# Patient Record
Sex: FEMALE | Race: WHITE | Employment: STUDENT | RURAL
[De-identification: names, ages, dates, MRNs, and addresses within clinical notes are randomized per-mention and may not be internally consistent; named-entity substitution may affect disease eponyms.]

---

## 2023-02-16 ENCOUNTER — OFFICE VISIT (OUTPATIENT)
Dept: FAMILY MEDICINE CLINIC | Age: 23
End: 2023-02-16
Payer: COMMERCIAL

## 2023-02-16 VITALS
WEIGHT: 125 LBS | DIASTOLIC BLOOD PRESSURE: 76 MMHG | SYSTOLIC BLOOD PRESSURE: 130 MMHG | TEMPERATURE: 98.4 F | HEART RATE: 104 BPM | RESPIRATION RATE: 17 BRPM | HEIGHT: 61 IN | BODY MASS INDEX: 23.6 KG/M2 | OXYGEN SATURATION: 99 %

## 2023-02-16 DIAGNOSIS — Z11.59 NEED FOR HEPATITIS B SCREENING TEST: ICD-10-CM

## 2023-02-16 DIAGNOSIS — Z23 ENCOUNTER FOR IMMUNIZATION: ICD-10-CM

## 2023-02-16 DIAGNOSIS — Z00.00 ROUTINE PHYSICAL EXAMINATION: Primary | ICD-10-CM

## 2023-02-16 DIAGNOSIS — Z11.1 SCREENING FOR TUBERCULOSIS: ICD-10-CM

## 2023-02-16 PROBLEM — L70.9 ACNE: Status: ACTIVE | Noted: 2023-02-16

## 2023-02-16 PROBLEM — N91.4 SECONDARY OLIGOMENORRHEA: Status: ACTIVE | Noted: 2023-02-16

## 2023-02-16 PROBLEM — E28.2 POLYCYSTIC OVARIES: Status: ACTIVE | Noted: 2023-02-16

## 2023-02-16 PROBLEM — E55.9 VITAMIN D DEFICIENCY: Status: ACTIVE | Noted: 2023-02-16

## 2023-02-16 PROCEDURE — 90715 TDAP VACCINE 7 YRS/> IM: CPT | Performed by: FAMILY MEDICINE

## 2023-02-16 PROCEDURE — 99385 PREV VISIT NEW AGE 18-39: CPT | Performed by: FAMILY MEDICINE

## 2023-02-16 PROCEDURE — 90471 IMMUNIZATION ADMIN: CPT | Performed by: FAMILY MEDICINE

## 2023-02-16 RX ORDER — LANOLIN ALCOHOL/MO/W.PET/CERES
1000 CREAM (GRAM) TOPICAL DAILY
COMMUNITY

## 2023-02-16 RX ORDER — SPIRONOLACTONE 100 MG/1
100 TABLET, FILM COATED ORAL DAILY
COMMUNITY

## 2023-02-16 RX ORDER — METFORMIN HYDROCHLORIDE 500 MG/1
2 TABLET, EXTENDED RELEASE ORAL DAILY
COMMUNITY

## 2023-02-16 RX ORDER — TETANUS TOXOID, REDUCED DIPHTHERIA TOXOID AND ACELLULAR PERTUSSIS VACCINE, ADSORBED 5; 2.5; 8; 8; 2.5 [IU]/.5ML; [IU]/.5ML; UG/.5ML; UG/.5ML; UG/.5ML
0.5 SUSPENSION INTRAMUSCULAR ONCE
Qty: 0.5 ML | Refills: 0 | Status: SHIPPED | OUTPATIENT
Start: 2023-02-16 | End: 2023-02-16 | Stop reason: ALTCHOICE

## 2023-02-16 RX ORDER — CHOLECALCIFEROL (VITAMIN D3) 125 MCG
2000 CAPSULE ORAL DAILY
COMMUNITY

## 2023-02-16 NOTE — PROGRESS NOTES
Chief Complaint   Patient presents with    Establish Care     1. \"Have you been to the ER, urgent care clinic since your last visit? Hospitalized since your last visit? \" No    2. \"Have you seen or consulted any other health care providers outside of the [address] [place] [address] since your last visit? \"  New patient. Sees endocrinology. Records requested. 3. For patients aged 39-70: Has the patient had a colonoscopy / FIT/ Cologuard? NA - based on age      If the patient is female:    4. For patients aged 41-77: Has the patient had a mammogram within the past 2 years? NA - based on age or sex      11. For patients aged 21-65: Has the patient had a pap smear?  No    Health Maintenance Due   Topic Date Due    Hepatitis C Screening  Never done    Depression Screen  Never done    HPV Age 9Y-34Y (1 - 2-dose series) Never done    DTaP/Tdap/Td series (1 - Tdap) Never done    Pap Smear  Never done    COVID-19 Vaccine (2 - Pfizer series) 12/10/2021

## 2023-02-16 NOTE — LETTER
2/16/2023 10:20 AM    Ms. Chris Benito  [address] [place] [address]        Immunization History   Administered Date(s) Administered    COVID-19, PFIZER PURPLE top, DILUTE for use, (age 15 y+), IM, 30mcg/0.3mL 11/19/2021    Influenza Vaccine 01/02/2018, 01/04/2019, 01/07/2020, 10/01/2021, 09/29/2022    Tdap 02/16/2023           Sincerely,      Jacquelyn Alanis MD

## 2023-02-16 NOTE — LETTER
To Medical Records,      Please fax us the most recent office visit notes, lab results, diagnostic test results, immunization list, problem list, most recent medication list so that we may update the patient's records for continuity of care. Our fax number: [phone]    Patient:    Saintclair Apgar  2000

## 2023-02-16 NOTE — LETTER
To Dr. Radha Bermeo,        Please fax us the most recent office visit notes, problem list, recent medication list, any lab or diagnostic test results so that we may update the patient's records for continuity of care. Our fax number: [phone]    Patient:    Edu Vera  2000

## 2023-02-16 NOTE — PROGRESS NOTES
[facility]    History of Present Illness: Lyndon Hernandez is a 20 y.o. female here for   Chief Complaint   Patient presents with    Establish Care         HPI:  Patient here for school physical.  She will be starting at [employer] in July. She is followed by endocrinology for PCOS and has labs done there routinely. She denies any cough, f/c/night sweats, no weight loss. No known tb exposure. No h/o positive ppd. Due for tb testing today. Needs tdap well as hepatitis B titers. Health Maintenance  Health Maintenance Due   Topic Date Due    Hepatitis C Screening  Never done    Depression Screen  Never done    HPV Age 9Y-34Y (1 - 2-dose series) Never done    Pap Smear  Never done    COVID-19 Vaccine (2 - Pfizer series) 12/10/2021       Past Medical, Family, and Social History:     Past Medical History:   Diagnosis Date    PCOS (polycystic ovarian syndrome)       Past Surgical History:   Procedure Laterality Date    HX WISDOM TEETH EXTRACTION Bilateral        Current Outpatient Medications on File Prior to Visit   Medication Sig Dispense Refill    metFORMIN ER (GLUCOPHAGE XR) 500 mg tablet Take 2 Tablets by mouth daily. spironolactone (ALDACTONE) 100 mg tablet Take 100 mg by mouth daily. cholecalciferol, vitamin D3, (Vitamin D3) 50 mcg (2,000 unit) tab Take 2,000 Units by mouth daily. cyanocobalamin 1,000 mcg tablet Take 1,000 mcg by mouth daily. No current facility-administered medications on file prior to visit.        Patient Active Problem List   Diagnosis Code    Acne L70.9    Polycystic ovaries E28.2    Vitamin D deficiency E55.9    Secondary oligomenorrhea N91.4       Social History     Socioeconomic History    Marital status: SINGLE   Tobacco Use    Smoking status: Never    Smokeless tobacco: Never   Vaping Use    Vaping Use: Never used   Substance and Sexual Activity    Alcohol use: Yes     Comment: occasionally    Drug use: Not Currently Sexual activity: Yes     Partners: Male     Birth control/protection: Condom     Social Determinants of Health     Financial Resource Strain: Low Risk     Difficulty of Paying Living Expenses: Not hard at all   Food Insecurity: No Food Insecurity    Worried About [address] in the Last Year: Never true    [address] [facility] [address] in the Last Year: Never true   Transportation Needs: No Transportation Needs    Lack of Transportation (Medical): No    Lack of Transportation (Non-Medical): No   Housing Stability: Unknown    Unable to Pay for Housing in the Last Year: No    Unstable Housing in the Last Year: No        Review of Systems   Review of Systems   Constitutional:  Negative for chills and fever. Respiratory:  Negative for cough and shortness of breath. Cardiovascular:  Negative for chest pain. Psychiatric/Behavioral:  Negative for depression and suicidal ideas.       Objective:   Visit Vitals  /76 (BP 1 Location: Right arm, BP Patient Position: Sitting, BP Cuff Size: Adult)   Pulse (!) 104   Temp 98.4 °F (36.9 °C) (Oral)   Resp 17   Ht 5' 1.42\" (1.56 m)   Wt 125 lb (56.7 kg)   SpO2 99%   BMI 23.30 kg/m²        Physical Exam  PHYSICAL EXAM:  Gen: Pt sitting in chair, in NAD  Head: Normocephalic, atraumatic  Eyes: Sclera anicteric, EOM grossly intact, PERRL  Ears: TM's pearly with good light reflex b/l  Throat: MMM, normal lips, tongue, teeth and gums  Neck: Supple, no LAD, no thyromegaly   CVS: Normal S1, S2, no m/r/g  Resp: CTAB, no wheezes or rales  Abd: Soft, non-tender, non-distended, +BS  Extrem: Atraumatic, no cyanosis or edema  Pulses: 2+   Skin: Warm, dry  Neuro: Alert, oriented, appropriate    Pertinent Labs/Studies:  3 most recent PHQ Screens 2/16/2023   Little interest or pleasure in doing things Not at all   Feeling down, depressed, irritable, or hopeless Not at all   Total Score PHQ 2 0   Trouble falling or staying asleep, or sleeping too much Not at all   Feeling tired or having little energy Not at all   Poor appetite, weight loss, or overeating Not at all   Feeling bad about yourself - or that you are a failure or have let yourself or your family down Not at all   Trouble concentrating on things such as school, work, reading, or watching TV Not at all   Moving or speaking so slowly that other people could have noticed; or the opposite being so fidgety that others notice Not at all   Thoughts of being better off dead, or hurting yourself in some way Not at all   PHQ 9 Score 0      Vision 20/20    Assessment and orders:       ICD-10-CM ICD-9-CM    1. Routine physical examination  Z00.00 V70.0       2. Encounter for immunization  Z23 V03.89 [place] IMMUNIZ ADMIN,1 SINGLE/COMB VAC/TOXOID      TDAP, BOOSTRIX, (AGE 10 YRS+), IM      DISCONTINUED: diphth, pertus,acell,, tetanus (Boostrix Tdap) 2.5-8-5 Lf-mcg-Lf/0.5mL syrg      3. Need for hepatitis B screening test  Z11.59 V73.89 HEPATITIS B SURF AB QUANT      4. Screening for tuberculosis  Z11.1 V74.1 QUANTIFERON-TB GOLD PLUS        Diagnoses and all orders for this visit:    1. Routine physical examination    2. Encounter for immunization  -     NV IMMUNIZ ADMIN,1 SINGLE/COMB VAC/TOXOID  -     TDAP, BOOSTRIX, (AGE 10 YRS+), IM    3. Need for hepatitis B screening test  -     HEPATITIS B SURF AB QUANT    4. Screening for tuberculosis  -     QUANTIFERON-TB GOLD PLUS    Form completed, labs ordered  lab results and schedule of future lab studies reviewed with patient      I have discussed the diagnosis with the patient and the intended plan as seen in the above orders. Social history, medical history, and labs were reviewed. The patient has received an after-visit summary and questions were answered concerning future plans. I have discussed medication side effects and warnings with the patient as well. Patient/guardian verbalized understanding and accepts plan & risks.    Please note that this dictation was completed with Well, the [employer] voice recognition software. Quite often unanticipated grammatical, syntax, homophones, and other interpretive errors are inadvertently transcribed by the computer software. Please disregard these errors. Please excuse any errors that have escaped final proofreading. Thank you.      MD AUDREY Miller [facility]  02/16/23

## 2023-02-17 LAB
HBV SURFACE AB SER QL: REACTIVE
HBV SURFACE AB SER-ACNC: 19.96 MIU/ML

## 2023-02-22 ENCOUNTER — TELEPHONE (OUTPATIENT)
Dept: FAMILY MEDICINE CLINIC | Age: 23
End: 2023-02-22